# Patient Record
Sex: MALE | Race: WHITE | URBAN - METROPOLITAN AREA
[De-identification: names, ages, dates, MRNs, and addresses within clinical notes are randomized per-mention and may not be internally consistent; named-entity substitution may affect disease eponyms.]

---

## 2020-12-09 ENCOUNTER — TELEPHONE (OUTPATIENT)
Dept: FAMILY MEDICINE CLINIC | Age: 65
End: 2020-12-09

## 2020-12-09 NOTE — TELEPHONE ENCOUNTER
Pt wife is requesting New Pt appointment for pt before 12/16/20. Pt is on Coumadin, hasn't had an INR since the beginning of the year. Pt has approximately 7 days of medication left. Pt insurance has changed and previous provider doesn't take pt insurance and will not follow pt until pt finds a new provider. Pt also has Alzheimer's which has quickly progressed over two weeks. Pt wife isn't sure if pt may have UTI. Pt has not become violent, but no longer remembers where he is or who anyone is. Pt also has Neuropathy and had 2 strokes about 10 years ago.